# Patient Record
Sex: MALE | Race: WHITE | HISPANIC OR LATINO
[De-identification: names, ages, dates, MRNs, and addresses within clinical notes are randomized per-mention and may not be internally consistent; named-entity substitution may affect disease eponyms.]

---

## 2022-09-05 ENCOUNTER — TRANSCRIPTION ENCOUNTER (OUTPATIENT)
Age: 27
End: 2022-09-05

## 2022-09-06 ENCOUNTER — RESULT REVIEW (OUTPATIENT)
Age: 27
End: 2022-09-06

## 2022-09-06 ENCOUNTER — INPATIENT (INPATIENT)
Facility: HOSPITAL | Age: 27
LOS: 0 days | Discharge: HOME | DRG: 342 | End: 2022-09-07
Attending: STUDENT IN AN ORGANIZED HEALTH CARE EDUCATION/TRAINING PROGRAM | Admitting: INTERNAL MEDICINE
Payer: MEDICAID

## 2022-09-06 VITALS
RESPIRATION RATE: 18 BRPM | OXYGEN SATURATION: 98 % | DIASTOLIC BLOOD PRESSURE: 88 MMHG | HEIGHT: 66 IN | TEMPERATURE: 98 F | HEART RATE: 86 BPM | SYSTOLIC BLOOD PRESSURE: 136 MMHG

## 2022-09-06 DIAGNOSIS — K35.80 UNSPECIFIED ACUTE APPENDICITIS: ICD-10-CM

## 2022-09-06 LAB
ALBUMIN SERPL ELPH-MCNC: 4.1 G/DL — SIGNIFICANT CHANGE UP (ref 3.3–5)
ALP SERPL-CCNC: 107 U/L — SIGNIFICANT CHANGE UP (ref 40–120)
ALT FLD-CCNC: 51 U/L — HIGH (ref 10–45)
ANION GAP SERPL CALC-SCNC: 6 MMOL/L — SIGNIFICANT CHANGE UP (ref 5–17)
APPEARANCE UR: CLEAR — SIGNIFICANT CHANGE UP
APTT BLD: 29.4 SEC — SIGNIFICANT CHANGE UP (ref 27.5–35.5)
AST SERPL-CCNC: 21 U/L — SIGNIFICANT CHANGE UP (ref 10–40)
BASOPHILS # BLD AUTO: 0.05 K/UL — SIGNIFICANT CHANGE UP (ref 0–0.2)
BASOPHILS NFR BLD AUTO: 0.4 % — SIGNIFICANT CHANGE UP (ref 0–2)
BILIRUB SERPL-MCNC: 0.2 MG/DL — SIGNIFICANT CHANGE UP (ref 0.2–1.2)
BILIRUB UR-MCNC: NEGATIVE — SIGNIFICANT CHANGE UP
BLD GP AB SCN SERPL QL: SIGNIFICANT CHANGE UP
BUN SERPL-MCNC: 16 MG/DL — SIGNIFICANT CHANGE UP (ref 7–23)
CALCIUM SERPL-MCNC: 8.9 MG/DL — SIGNIFICANT CHANGE UP (ref 8.4–10.5)
CHLORIDE SERPL-SCNC: 102 MMOL/L — SIGNIFICANT CHANGE UP (ref 96–108)
CO2 SERPL-SCNC: 29 MMOL/L — SIGNIFICANT CHANGE UP (ref 22–31)
COLOR SPEC: YELLOW — SIGNIFICANT CHANGE UP
CREAT SERPL-MCNC: 1.11 MG/DL — SIGNIFICANT CHANGE UP (ref 0.5–1.3)
DIFF PNL FLD: ABNORMAL
EGFR: 93 ML/MIN/1.73M2 — SIGNIFICANT CHANGE UP
EOSINOPHIL # BLD AUTO: 0.32 K/UL — SIGNIFICANT CHANGE UP (ref 0–0.5)
EOSINOPHIL NFR BLD AUTO: 2.5 % — SIGNIFICANT CHANGE UP (ref 0–6)
EPI CELLS # UR: SIGNIFICANT CHANGE UP
GLUCOSE SERPL-MCNC: 95 MG/DL — SIGNIFICANT CHANGE UP (ref 70–99)
GLUCOSE UR QL: NEGATIVE — SIGNIFICANT CHANGE UP
HCT VFR BLD CALC: 45.1 % — SIGNIFICANT CHANGE UP (ref 39–50)
HGB BLD-MCNC: 15.3 G/DL — SIGNIFICANT CHANGE UP (ref 13–17)
IMM GRANULOCYTES NFR BLD AUTO: 0.7 % — SIGNIFICANT CHANGE UP (ref 0–1.5)
INR BLD: 1.05 RATIO — SIGNIFICANT CHANGE UP (ref 0.88–1.16)
KETONES UR-MCNC: NEGATIVE — SIGNIFICANT CHANGE UP
LEUKOCYTE ESTERASE UR-ACNC: NEGATIVE — SIGNIFICANT CHANGE UP
LIDOCAIN IGE QN: 63 U/L — LOW (ref 73–393)
LYMPHOCYTES # BLD AUTO: 17.9 % — SIGNIFICANT CHANGE UP (ref 13–44)
LYMPHOCYTES # BLD AUTO: 2.29 K/UL — SIGNIFICANT CHANGE UP (ref 1–3.3)
MCHC RBC-ENTMCNC: 29.3 PG — SIGNIFICANT CHANGE UP (ref 27–34)
MCHC RBC-ENTMCNC: 33.9 GM/DL — SIGNIFICANT CHANGE UP (ref 32–36)
MCV RBC AUTO: 86.4 FL — SIGNIFICANT CHANGE UP (ref 80–100)
MONOCYTES # BLD AUTO: 1.03 K/UL — HIGH (ref 0–0.9)
MONOCYTES NFR BLD AUTO: 8.1 % — SIGNIFICANT CHANGE UP (ref 2–14)
NEUTROPHILS # BLD AUTO: 9.01 K/UL — HIGH (ref 1.8–7.4)
NEUTROPHILS NFR BLD AUTO: 70.4 % — SIGNIFICANT CHANGE UP (ref 43–77)
NITRITE UR-MCNC: NEGATIVE — SIGNIFICANT CHANGE UP
NRBC # BLD: 0 /100 WBCS — SIGNIFICANT CHANGE UP (ref 0–0)
PH UR: 6 — SIGNIFICANT CHANGE UP (ref 5–8)
PLATELET # BLD AUTO: 249 K/UL — SIGNIFICANT CHANGE UP (ref 150–400)
POTASSIUM SERPL-MCNC: 4.1 MMOL/L — SIGNIFICANT CHANGE UP (ref 3.5–5.3)
POTASSIUM SERPL-SCNC: 4.1 MMOL/L — SIGNIFICANT CHANGE UP (ref 3.5–5.3)
PROT SERPL-MCNC: 7.9 G/DL — SIGNIFICANT CHANGE UP (ref 6–8.3)
PROT UR-MCNC: NEGATIVE — SIGNIFICANT CHANGE UP
PROTHROM AB SERPL-ACNC: 12.2 SEC — SIGNIFICANT CHANGE UP (ref 10.5–13.4)
RBC # BLD: 5.22 M/UL — SIGNIFICANT CHANGE UP (ref 4.2–5.8)
RBC # FLD: 12.3 % — SIGNIFICANT CHANGE UP (ref 10.3–14.5)
RBC CASTS # UR COMP ASSIST: NEGATIVE /HPF — SIGNIFICANT CHANGE UP (ref 0–4)
SARS-COV-2 RNA SPEC QL NAA+PROBE: SIGNIFICANT CHANGE UP
SODIUM SERPL-SCNC: 137 MMOL/L — SIGNIFICANT CHANGE UP (ref 135–145)
SP GR SPEC: 1.01 — SIGNIFICANT CHANGE UP (ref 1.01–1.02)
UROBILINOGEN FLD QL: NEGATIVE — SIGNIFICANT CHANGE UP
WBC # BLD: 12.79 K/UL — HIGH (ref 3.8–10.5)
WBC # FLD AUTO: 12.79 K/UL — HIGH (ref 3.8–10.5)
WBC UR QL: NEGATIVE /HPF — SIGNIFICANT CHANGE UP (ref 0–5)

## 2022-09-06 PROCEDURE — 44970 LAPAROSCOPY APPENDECTOMY: CPT

## 2022-09-06 PROCEDURE — 93010 ELECTROCARDIOGRAM REPORT: CPT

## 2022-09-06 PROCEDURE — 71045 X-RAY EXAM CHEST 1 VIEW: CPT | Mod: 26

## 2022-09-06 PROCEDURE — 99223 1ST HOSP IP/OBS HIGH 75: CPT

## 2022-09-06 PROCEDURE — 88304 TISSUE EXAM BY PATHOLOGIST: CPT | Mod: 26

## 2022-09-06 PROCEDURE — 74177 CT ABD & PELVIS W/CONTRAST: CPT | Mod: 26,MA

## 2022-09-06 PROCEDURE — 99285 EMERGENCY DEPT VISIT HI MDM: CPT

## 2022-09-06 PROCEDURE — 99253 IP/OBS CNSLTJ NEW/EST LOW 45: CPT | Mod: 57

## 2022-09-06 DEVICE — STAPLER COVIDIEN TRI-STAPLE 30MM TAN RELOAD: Type: IMPLANTABLE DEVICE | Status: FUNCTIONAL

## 2022-09-06 DEVICE — STAPLER COVIDIEN TRI-STAPLE 30MM GRAY RELOAD: Type: IMPLANTABLE DEVICE | Status: FUNCTIONAL

## 2022-09-06 DEVICE — STAPLER COVIDIEN TRI-STAPLE 30MM PURPLE RELOAD: Type: IMPLANTABLE DEVICE | Status: FUNCTIONAL

## 2022-09-06 DEVICE — STAPLER COVIDIEN TRI-STAPLE 60MM PURPLE RELOAD: Type: IMPLANTABLE DEVICE | Status: FUNCTIONAL

## 2022-09-06 DEVICE — CLIP APPLIER COVIDIEN ENDOCLIP 5MM: Type: IMPLANTABLE DEVICE | Status: FUNCTIONAL

## 2022-09-06 DEVICE — CLIP APPLIER COVIDIEN ENDOCLIP II 10MM MED/LG: Type: IMPLANTABLE DEVICE | Status: FUNCTIONAL

## 2022-09-06 RX ORDER — HYDROMORPHONE HYDROCHLORIDE 2 MG/ML
0.5 INJECTION INTRAMUSCULAR; INTRAVENOUS; SUBCUTANEOUS
Refills: 0 | Status: DISCONTINUED | OUTPATIENT
Start: 2022-09-06 | End: 2022-09-07

## 2022-09-06 RX ORDER — ONDANSETRON 8 MG/1
4 TABLET, FILM COATED ORAL ONCE
Refills: 0 | Status: DISCONTINUED | OUTPATIENT
Start: 2022-09-06 | End: 2022-09-07

## 2022-09-06 RX ORDER — LANOLIN ALCOHOL/MO/W.PET/CERES
3 CREAM (GRAM) TOPICAL AT BEDTIME
Refills: 0 | Status: DISCONTINUED | OUTPATIENT
Start: 2022-09-06 | End: 2022-09-07

## 2022-09-06 RX ORDER — CIPROFLOXACIN LACTATE 400MG/40ML
400 VIAL (ML) INTRAVENOUS ONCE
Refills: 0 | Status: COMPLETED | OUTPATIENT
Start: 2022-09-06 | End: 2022-09-06

## 2022-09-06 RX ORDER — ONDANSETRON 8 MG/1
4 TABLET, FILM COATED ORAL EVERY 8 HOURS
Refills: 0 | Status: DISCONTINUED | OUTPATIENT
Start: 2022-09-06 | End: 2022-09-07

## 2022-09-06 RX ORDER — MORPHINE SULFATE 50 MG/1
2 CAPSULE, EXTENDED RELEASE ORAL EVERY 4 HOURS
Refills: 0 | Status: DISCONTINUED | OUTPATIENT
Start: 2022-09-06 | End: 2022-09-07

## 2022-09-06 RX ORDER — METRONIDAZOLE 500 MG
500 TABLET ORAL ONCE
Refills: 0 | Status: COMPLETED | OUTPATIENT
Start: 2022-09-06 | End: 2022-09-06

## 2022-09-06 RX ORDER — MORPHINE SULFATE 50 MG/1
4 CAPSULE, EXTENDED RELEASE ORAL ONCE
Refills: 0 | Status: DISCONTINUED | OUTPATIENT
Start: 2022-09-06 | End: 2022-09-06

## 2022-09-06 RX ORDER — HYDROMORPHONE HYDROCHLORIDE 2 MG/ML
1 INJECTION INTRAMUSCULAR; INTRAVENOUS; SUBCUTANEOUS
Refills: 0 | Status: DISCONTINUED | OUTPATIENT
Start: 2022-09-06 | End: 2022-09-07

## 2022-09-06 RX ORDER — CIPROFLOXACIN LACTATE 400MG/40ML
400 VIAL (ML) INTRAVENOUS EVERY 12 HOURS
Refills: 0 | Status: DISCONTINUED | OUTPATIENT
Start: 2022-09-07 | End: 2022-09-07

## 2022-09-06 RX ORDER — SODIUM CHLORIDE 9 MG/ML
1000 INJECTION, SOLUTION INTRAVENOUS
Refills: 0 | Status: DISCONTINUED | OUTPATIENT
Start: 2022-09-06 | End: 2022-09-07

## 2022-09-06 RX ORDER — METRONIDAZOLE 500 MG
500 TABLET ORAL EVERY 8 HOURS
Refills: 0 | Status: DISCONTINUED | OUTPATIENT
Start: 2022-09-06 | End: 2022-09-07

## 2022-09-06 RX ORDER — SODIUM CHLORIDE 9 MG/ML
1000 INJECTION INTRAMUSCULAR; INTRAVENOUS; SUBCUTANEOUS ONCE
Refills: 0 | Status: COMPLETED | OUTPATIENT
Start: 2022-09-06 | End: 2022-09-06

## 2022-09-06 RX ORDER — SODIUM CHLORIDE 9 MG/ML
1000 INJECTION INTRAMUSCULAR; INTRAVENOUS; SUBCUTANEOUS
Refills: 0 | Status: DISCONTINUED | OUTPATIENT
Start: 2022-09-06 | End: 2022-09-07

## 2022-09-06 RX ORDER — ACETAMINOPHEN 500 MG
650 TABLET ORAL EVERY 6 HOURS
Refills: 0 | Status: DISCONTINUED | OUTPATIENT
Start: 2022-09-06 | End: 2022-09-07

## 2022-09-06 RX ADMIN — MORPHINE SULFATE 4 MILLIGRAM(S): 50 CAPSULE, EXTENDED RELEASE ORAL at 12:32

## 2022-09-06 RX ADMIN — Medication 500 MILLIGRAM(S): at 15:06

## 2022-09-06 RX ADMIN — Medication 200 MILLIGRAM(S): at 15:06

## 2022-09-06 RX ADMIN — SODIUM CHLORIDE 1000 MILLILITER(S): 9 INJECTION INTRAMUSCULAR; INTRAVENOUS; SUBCUTANEOUS at 12:26

## 2022-09-06 RX ADMIN — Medication 100 MILLIGRAM(S): at 14:06

## 2022-09-06 RX ADMIN — MORPHINE SULFATE 4 MILLIGRAM(S): 50 CAPSULE, EXTENDED RELEASE ORAL at 13:02

## 2022-09-06 RX ADMIN — SODIUM CHLORIDE 1000 MILLILITER(S): 9 INJECTION INTRAMUSCULAR; INTRAVENOUS; SUBCUTANEOUS at 13:26

## 2022-09-06 NOTE — H&P ADULT - NSHPPHYSICALEXAM_GEN_ALL_CORE
Vital Signs Last 24 Hrs  T(F): 98.3 (06 Sep 2022 11:49), Max: 98.3 (06 Sep 2022 11:49)  HR: 86 (06 Sep 2022 11:49) (86 - 86)  BP: 136/88 (06 Sep 2022 11:49) (136/88 - 136/88)  RR: 18 (06 Sep 2022 11:49) (18 - 18)  SpO2: 98% (06 Sep 2022 11:49) (98% - 98%)    PHYSICAL EXAM:  GENERAL: NAD, well-groomed, well-developed  HEAD:  Atraumatic, Normocephalic  EYES: EOMI, conjunctiva and sclera clear  ENMT: Moist mucous membranes, Good dentition, no thrush  NECK: Supple, No JVD  CHEST/LUNG: Clear to auscultation bilaterally, good air entry, non-labored breathing  HEART: RRR; S1/S2, No murmur  ABDOMEN: Soft, Nontender, Nondistended; Bowel sounds present  VASCULAR: Normal pulses, Normal capillary refill  EXTREMITIES: No calf tenderness, No cyanosis, No edema  LYMPH: Normal; No lymphadenopathy noted  SKIN: Warm, Intact  PSYCH: Normal mood, Normal affect  NERVOUS SYSTEM:  A/O x3, Good concentration; CN 2-12 intact, No focal deficits Vital Signs Last 24 Hrs  T(F): 98.3 (06 Sep 2022 11:49), Max: 98.3 (06 Sep 2022 11:49)  HR: 86 (06 Sep 2022 11:49) (86 - 86)  BP: 136/88 (06 Sep 2022 11:49) (136/88 - 136/88)  RR: 18 (06 Sep 2022 11:49) (18 - 18)  SpO2: 98% (06 Sep 2022 11:49) (98% - 98%)    PHYSICAL EXAM:  GENERAL: NAD, well-groomed, well-developed  HEAD:  Atraumatic, Normocephalic  EYES: EOMI, conjunctiva and sclera clear  ENMT: Moist mucous membranes, Good dentition, no thrush  NECK: Supple, No JVD  CHEST/LUNG: Clear to auscultation bilaterally, good air entry, non-labored breathing  HEART: RRR; S1/S2, No murmur  ABDOMEN: Soft, +tenderess midpigastrium and RLQ, Nondistended; Bowel sounds present  VASCULAR: Normal pulses, Normal capillary refill  EXTREMITIES: No calf tenderness, No cyanosis, No edema  LYMPH: Normal; No lymphadenopathy noted  SKIN: Warm, Intact  PSYCH: Normal mood, Normal affect  NERVOUS SYSTEM:  A/O x3, Good concentration; CN 2-12 intact, No focal deficits

## 2022-09-06 NOTE — ED PROVIDER NOTE - OBJECTIVE STATEMENT
27 yr old male with no pmhx presents with generalized abdominal pain x 3 days. Pt reports bloody stool on toilet paper x 1 episode yesterday. Pt reports normal BM today. Denies fever, chills, n/v/d, headache, urinary complaints.

## 2022-09-06 NOTE — ED PROVIDER NOTE - NS ED ATTENDING STATEMENT MOD
This was a shared visit with the KAMALJIT. I reviewed and verified the documentation and independently performed the documented:

## 2022-09-06 NOTE — H&P ADULT - NSHPLABSRESULTS_GEN_ALL_CORE
15.3   12.79 )-----------( 249      ( 06 Sep 2022 12:25 )             45.1           137  |  102  |  16  ----------------------------<  95  4.1   |  29  |  1.11    Ca    8.9      06 Sep 2022 12:25    TPro  7.9  /  Alb  4.1  /  TBili  0.2  /  DBili  x   /  AST  21  /  ALT  51  /  AlkPhos  107      Urinalysis Basic - ( 06 Sep 2022 13:20 )    Color: Yellow / Appearance: Clear / S.015 / pH: x  Gluc: x / Ketone: Negative  / Bili: Negative / Urobili: Negative   Blood: x / Protein: Negative / Nitrite: Negative   Leuk Esterase: Negative / RBC: Negative /HPF / WBC Negative /HPF   Sq Epi: x / Non Sq Epi: Neg.-Few / Bacteria: x    Lipase, Serum: 63 U/L (22 @ 12:25)      RADIOLOGY:  CT Abdomen and Pelvis w/ IV Cont (22 @ 13:14) >  IMPRESSION:  Acute appendicitis.    Consultant(s) Notes Reveiwed [ x] Yes   Dr Hall, NPO for OR later today  Care Discussed with [ ] Consultants  [ x] Patient  [ ] Family  [ ] /   [ x] Other; RN

## 2022-09-06 NOTE — H&P ADULT - HISTORY OF PRESENT ILLNESS
28 yo male, no PMH, presenting to ER with abdominal pain.      CTAP shows acute appendicitis, Dr Hall consulted in ER  Given Ciprofloxacin and flagyl  To go to OR tonight 26 yo male, no PMH, presenting to ER with abdominal pain.  Patient reports pain started on Friday, midepigastric area, sharp in nature, intermittent in timing.  The pain worsened this morning and he decided to come to the ER.  He denies fever, cough, SOB, N/V, diarrhea, travel hx or sick contacts.    CTAP shows acute appendicitis, Dr Hall consulted in ER  Given Ciprofloxacin and flagyl  To go to OR tonight

## 2022-09-06 NOTE — H&P ADULT - ASSESSMENT
28 yo male, no PMH, presenting to ER with abdominal pain found to have acute appendicitis    *Acute appendicitis with leukocytosis    NPO for OR later today, IVF  Patient is in medical optimal condition for OR  Monitor F/WBC count, abx given in ER, further abx as per surgical team    DVT PPX: holding lovenox as going to OR today  AM labs, Full code  DISP: pending course

## 2022-09-06 NOTE — H&P ADULT - NSHPREVIEWOFSYSTEMS_GEN_ALL_CORE
REVIEW OF SYSTEMS:  CONSTITUTIONAL: No fever, weight loss, or fatigue  EYES: No eye pain, visual disturbances, or discharge  ENMT:  No difficulty hearing, tinnitus, vertigo; No sinus or throat pain  NECK: No pain or stiffness  BREASTS: No pain, masses, or nipple discharge  RESPIRATORY: No cough, wheezing, chills or hemoptysis; No shortness of breath  CARDIOVASCULAR: No chest pain, palpitations, dizziness, or leg swelling  GASTROINTESTINAL: No abdominal or epigastric pain. No nausea, vomiting, or hematemesis; No diarrhea or constipation. No melena or hematochezia.  GENITOURINARY: No dysuria, frequency, hematuria, or incontinence  NEUROLOGICAL: No headaches, memory loss, loss of strength, numbness, or tremors  SKIN: No itching, burning, rashes, or lesions   LYMPH NODES: No enlarged glands  ENDOCRINE: No heat or cold intolerance; No hair loss  MUSCULOSKELETAL: No joint pain or swelling; No muscle, back, or extremity pain  PSYCHIATRIC: No depression, anxiety, mood swings, or difficulty sleeping  HEME/LYMPH: No easy bruising, or bleeding gums  ALLERY AND IMMUNOLOGIC: No hives or eczema    ALL ROS REVIEWED AND NORMAL EXCEPT AS STATED ABOVE REVIEW OF SYSTEMS:  CONSTITUTIONAL: No fever, weight loss, or fatigue  EYES: No eye pain, visual disturbances, or discharge  ENMT:  No difficulty hearing, tinnitus, vertigo; No sinus or throat pain  NECK: No pain or stiffness  BREASTS: No pain, masses, or nipple discharge  RESPIRATORY: No cough, wheezing, chills or hemoptysis; No shortness of breath  CARDIOVASCULAR: No chest pain, palpitations, dizziness, or leg swelling  GASTROINTESTINAL: ++ abdominal/epigastric pain. No nausea, vomiting, or hematemesis; No diarrhea or constipation. No melena or hematochezia.  GENITOURINARY: No dysuria, frequency, hematuria, or incontinence  NEUROLOGICAL: No headaches, memory loss, loss of strength, numbness, or tremors  SKIN: No itching, burning, rashes, or lesions   LYMPH NODES: No enlarged glands  ENDOCRINE: No heat or cold intolerance; No hair loss  MUSCULOSKELETAL: No joint pain or swelling; No muscle, back, or extremity pain  PSYCHIATRIC: No depression, anxiety, mood swings, or difficulty sleeping  HEME/LYMPH: No easy bruising, or bleeding gums  ALLERY AND IMMUNOLOGIC: No hives or eczema    ALL ROS REVIEWED AND NORMAL EXCEPT AS STATED ABOVE

## 2022-09-06 NOTE — ED ADULT NURSE NOTE - OBJECTIVE STATEMENT
Pt came from home with c/o RLQ pain x 3 days. Pt states that the pain has worsened today, rating it 7/10. Pt denies any fevers, or n/v/d. Pt denies taking pain meds PTA. Pt with no PMH.

## 2022-09-06 NOTE — ED PROVIDER NOTE - ATTENDING APP SHARED VISIT CONTRIBUTION OF CARE
Radha with [facility]. 27 yr old male with no pmhx presents with generalized abdominal pain x 3 days. Pt reports bloody stool on toilet paper x 1 episode yesterday. Pt reports normal BM today. Denies fever, chills, n/v/d, headache, urinary complaints.   RLQ tenderness on exam   labs, CT ordered   labs and ct reviewed- + acute appendicitis   Dr. Hall consulted, pt will go to OR tonight. pt to be kept NPO. d/w Dr. Oconnell. pt agrees with plan    I have discussed the patient's plan of care with Physician Assistant (PA). I agree with note as stated above.  This includes History of Present Illness, HIV, Past Medical/Surgical/Family/Social History, Allergies and Home Medications, Review of Systems, Physical Exam, and any Progress Notes during the time I functioned as the attending physician for this patient.

## 2022-09-06 NOTE — H&P ADULT - NSHPSOCIALHISTORY_GEN_ALL_CORE
REVIEW OF SYSTEMS:  CONSTITUTIONAL: No fever, weight loss, or fatigue  EYES: No eye pain, visual disturbances, or discharge  ENMT:  No difficulty hearing, tinnitus, vertigo; No sinus or throat pain  NECK: No pain or stiffness  BREASTS: No pain, masses, or nipple discharge  RESPIRATORY: No cough, wheezing, chills or hemoptysis; No shortness of breath  CARDIOVASCULAR: No chest pain, palpitations, dizziness, or leg swelling  GASTROINTESTINAL: No abdominal or epigastric pain. No nausea, vomiting, or hematemesis; No diarrhea or constipation. No melena or hematochezia.  GENITOURINARY: No dysuria, frequency, hematuria, or incontinence  NEUROLOGICAL: No headaches, memory loss, loss of strength, numbness, or tremors  SKIN: No itching, burning, rashes, or lesions   LYMPH NODES: No enlarged glands  ENDOCRINE: No heat or cold intolerance; No hair loss  MUSCULOSKELETAL: No joint pain or swelling; No muscle, back, or extremity pain  PSYCHIATRIC: No depression, anxiety, mood swings, or difficulty sleeping  HEME/LYMPH: No easy bruising, or bleeding gums  ALLERY AND IMMUNOLOGIC: No hives or eczema    ALL ROS REVIEWED AND NORMAL EXCEPT AS STATED ABOVE Social History:    Marital Status: (  ) , ( x ) Single, (  ) , (  ) , (  )   Lives with: ( x ) alone, (  ) children, (  ) spouse, (  ) parents, (  ) siblings, (  ) friends, (  ) other:     Substance Use/Illicit Drugs: (  x) never used vs other:   Tobacco Usage: (x  ) never smoked, (  ) former smoker, (  ) current smoker and Total Pack-Years:   Last Alcohol Usage/Frequency/Amount/Withdrawal/Hx of Abuse:  Denies  Foreign travel: Denies  Animal exposure: Denies

## 2022-09-06 NOTE — CONSULT NOTE ADULT - ASSESSMENT
Impression/Plan:  YOSSI JORDAN is a 28yo man with acute appendicitis.     #ACUTE APPENDICITIS  - Added on for OR tonight.   - Continue cirpo/flagyl.   - NPO, IVF  --------------------------------------------------------    HPI:  YOSSI JORDAN is a 28yo man who presented with .     Medical Hx:    Surgical Hx:    Pertinent Home Medications:    Pertinent Inpatient Medications:  acetaminophen     Tablet .. 650 milliGRAM(s) Oral every 6 hours PRN  aluminum hydroxide/magnesium hydroxide/simethicone Suspension 30 milliLiter(s) Oral every 4 hours PRN  melatonin 3 milliGRAM(s) Oral at bedtime PRN  metroNIDAZOLE  IVPB 500 milliGRAM(s) IV Intermittent every 8 hours  morphine  - Injectable 2 milliGRAM(s) IV Push every 4 hours PRN  ondansetron Injectable 4 milliGRAM(s) IV Push every 8 hours PRN  sodium chloride 0.9%. 1000 milliLiter(s) IV Continuous <Continuous>    Allergies:  No Known Allergies    Social Hx:  No tobacco use, excessive ETOH use, or illicit drug use.     Family Hx:  No pertinent family history in first degree relatives      ROS:  Except as noted in the HPI or above, all other systems negative.     Objective:   T(C): 36.8, Max: 36.8 (09-06-22 @ 11:49)  HR: 86 (86 - 86)  BP: 136/88 (136/88 - 136/88)  RR: 18 (18 - 18)  SpO2: 98% (98% - 98%)    Physical Exam  Constitutional: No distress.  Head/Neck: EOMI; PERRL; no discharge or nodules.  Breasts: No discharge, erythema, or masses.  Back: No deformities or CVA tenderness.  Respiratory: Clear to auscultation, no accessory muscle use or distress.  Cardiovascular: RRR.  Gastrointestinal: Soft, non-distended, non-tender. No signs of diffuse peritonitis. No ventral or inguinal hernias.  Genitourinary: Not examined.  Anorectal: Not examined.  Extremities: No cyanosis, clubbing or edema.  Vascular: Warm, well-perfused.  Neurological: Alert and oriented. No focal deficits.   Skin: No pertinent lesions or rashes.   Lymph Nodes: No significant lymphadedenopathy.  Musculoskeletal: No joint pain, swelling or deformity; no limitation of movement  Psychiatric: Normal affect, verbalizations, behavior.     Laboratory Data    Other Pertinent Non-Imaging Data    Imaging Data Impression/Plan:  YOSSI JORDAN is a 28yo man with acute appendicitis.     #ACUTE APPENDICITIS  - Added on for OR tonight.   - Continue cirpo/flagyl.   - NPO, IVF    --------------------------------------------------------    HPI:  YOSSI JORDAN is a 28yo healthy man who presented with abdominal pain. Pain started around 9/2 as a constant, cramping, epigastric pain that fluctuated in intensity. No relation to food or significant modifying factors. Associated with diminished appetite and nausea without vomiting. No fevers, chills, dysuria, constipation, diarrhea, or other major symptoms at this time. Did not prevent him from continuing his work as a  but came in today because of persistent pain.     Medical Hx:  None    Surgical Hx:  None    Pertinent Home Medications:  None    Allergies:  **AMOXICILLIN**    Social Hx:  No tobacco use, excessive ETOH use, or illicit drug use.     Family Hx:  No pertinent family history in first degree relatives    ROS:  Except as noted in the HPI or above, all other systems negative.     Objective:   T(C): 36.8, Max: 36.8 (09-06-22 @ 11:49)  HR: 86 (86 - 86)  BP: 136/88 (136/88 - 136/88)  RR: 18 (18 - 18)  SpO2: 98% (98% - 98%)    Physical Exam  Constitutional: No distress.  Head/Neck: EOMI; PERRL; no discharge or nodules.  Back: No deformities or CVA tenderness.  Respiratory: Clear to auscultation, no accessory muscle use or distress.  Cardiovascular: RRR.  Gastrointestinal: Non-distended. TTP in the RLQ without signs of diffuse peritonitis.   Extremities: No cyanosis, clubbing or edema.  Vascular: Warm, well-perfused.  Neurological: Alert and oriented. No focal deficits.   Skin: No pertinent lesions or rashes.   Lymph Nodes: No significant lymphadedenopathy.  Musculoskeletal: No joint pain, swelling or deformity; no limitation of movement  Psychiatric: Normal affect, verbalizations, behavior.     Laboratory Data            x                   x     | x    | x      x     )------( x         --------------------<x                  x                   x     | x    | x        TP    x    | ALB x         CA x             PT 12.2     ----------------------  AST x    | ALT x         MAG x          PTT 29.4             ----------------------  TBIL x    | DBIL x         PHOS x         INR 1.05    ----------------------  ALP x       Imaging Data  CT ABD/PEL 9/6:  Dilated appendix with surrounding fat stranding.

## 2022-09-06 NOTE — PRE-OP CHECKLIST - VERIFY SURGICAL SITE/SIDE WITH PATIENT
Fasting previous blood tests have been ordered.  
Patient is scheduled for a complete physical on 5/14/21.  Patient was advised to come in for fasting lab work 3-7 days prior to the physical.  Lab visit has been scheduled.  Please place lab order.             
done

## 2022-09-06 NOTE — ED PROVIDER NOTE - CLINICAL SUMMARY MEDICAL DECISION MAKING FREE TEXT BOX
27 yr old male with no pmhx presents with generalized abdominal pain x 3 days. Pt reports bloody stool on toilet paper x 1 episode yesterday. Pt reports normal BM today. Denies fever, chills, n/v/d, headache, urinary complaints.   RLQ tenderness on exam   labs, CT ordered 27 yr old male with no pmhx presents with generalized abdominal pain x 3 days. Pt reports bloody stool on toilet paper x 1 episode yesterday. Pt reports normal BM today. Denies fever, chills, n/v/d, headache, urinary complaints.   RLQ tenderness on exam   labs, CT ordered   labs and ct reviewed- + acute appendicitis   Dr. Hall consulted, pt will go to OR tonight. pt to be kept NPO. d/w Dr. Oconnell. pt agrees with plan

## 2022-09-07 ENCOUNTER — TRANSCRIPTION ENCOUNTER (OUTPATIENT)
Age: 27
End: 2022-09-07

## 2022-09-07 VITALS
DIASTOLIC BLOOD PRESSURE: 73 MMHG | SYSTOLIC BLOOD PRESSURE: 107 MMHG | OXYGEN SATURATION: 94 % | TEMPERATURE: 97 F | RESPIRATION RATE: 18 BRPM | HEART RATE: 97 BPM

## 2022-09-07 LAB
ANION GAP SERPL CALC-SCNC: 10 MMOL/L — SIGNIFICANT CHANGE UP (ref 5–17)
ANION GAP SERPL CALC-SCNC: 6 MMOL/L — SIGNIFICANT CHANGE UP (ref 5–17)
BASOPHILS # BLD AUTO: 0.01 K/UL — SIGNIFICANT CHANGE UP (ref 0–0.2)
BASOPHILS NFR BLD AUTO: 0.1 % — SIGNIFICANT CHANGE UP (ref 0–2)
BUN SERPL-MCNC: 15 MG/DL — SIGNIFICANT CHANGE UP (ref 7–23)
BUN SERPL-MCNC: 16 MG/DL — SIGNIFICANT CHANGE UP (ref 7–23)
CALCIUM SERPL-MCNC: 8.7 MG/DL — SIGNIFICANT CHANGE UP (ref 8.4–10.5)
CALCIUM SERPL-MCNC: 9 MG/DL — SIGNIFICANT CHANGE UP (ref 8.4–10.5)
CHLORIDE SERPL-SCNC: 102 MMOL/L — SIGNIFICANT CHANGE UP (ref 96–108)
CHLORIDE SERPL-SCNC: 103 MMOL/L — SIGNIFICANT CHANGE UP (ref 96–108)
CO2 SERPL-SCNC: 24 MMOL/L — SIGNIFICANT CHANGE UP (ref 22–31)
CO2 SERPL-SCNC: 28 MMOL/L — SIGNIFICANT CHANGE UP (ref 22–31)
CREAT SERPL-MCNC: 0.98 MG/DL — SIGNIFICANT CHANGE UP (ref 0.5–1.3)
CREAT SERPL-MCNC: 1.46 MG/DL — HIGH (ref 0.5–1.3)
CULTURE RESULTS: NO GROWTH — SIGNIFICANT CHANGE UP
EGFR: 108 ML/MIN/1.73M2 — SIGNIFICANT CHANGE UP
EGFR: 67 ML/MIN/1.73M2 — SIGNIFICANT CHANGE UP
EOSINOPHIL # BLD AUTO: 0 K/UL — SIGNIFICANT CHANGE UP (ref 0–0.5)
EOSINOPHIL NFR BLD AUTO: 0 % — SIGNIFICANT CHANGE UP (ref 0–6)
GLUCOSE SERPL-MCNC: 122 MG/DL — HIGH (ref 70–99)
GLUCOSE SERPL-MCNC: 177 MG/DL — HIGH (ref 70–99)
HCT VFR BLD CALC: 43.1 % — SIGNIFICANT CHANGE UP (ref 39–50)
HGB BLD-MCNC: 14.6 G/DL — SIGNIFICANT CHANGE UP (ref 13–17)
IMM GRANULOCYTES NFR BLD AUTO: 0.7 % — SIGNIFICANT CHANGE UP (ref 0–1.5)
LYMPHOCYTES # BLD AUTO: 0.91 K/UL — LOW (ref 1–3.3)
LYMPHOCYTES # BLD AUTO: 8.4 % — LOW (ref 13–44)
MAGNESIUM SERPL-MCNC: 1.8 MG/DL — SIGNIFICANT CHANGE UP (ref 1.6–2.6)
MCHC RBC-ENTMCNC: 29.1 PG — SIGNIFICANT CHANGE UP (ref 27–34)
MCHC RBC-ENTMCNC: 33.9 GM/DL — SIGNIFICANT CHANGE UP (ref 32–36)
MCV RBC AUTO: 85.9 FL — SIGNIFICANT CHANGE UP (ref 80–100)
MONOCYTES # BLD AUTO: 0.17 K/UL — SIGNIFICANT CHANGE UP (ref 0–0.9)
MONOCYTES NFR BLD AUTO: 1.6 % — LOW (ref 2–14)
NEUTROPHILS # BLD AUTO: 9.72 K/UL — HIGH (ref 1.8–7.4)
NEUTROPHILS NFR BLD AUTO: 89.2 % — HIGH (ref 43–77)
NRBC # BLD: 0 /100 WBCS — SIGNIFICANT CHANGE UP (ref 0–0)
PLATELET # BLD AUTO: 234 K/UL — SIGNIFICANT CHANGE UP (ref 150–400)
POTASSIUM SERPL-MCNC: 3.5 MMOL/L — SIGNIFICANT CHANGE UP (ref 3.5–5.3)
POTASSIUM SERPL-MCNC: 3.8 MMOL/L — SIGNIFICANT CHANGE UP (ref 3.5–5.3)
POTASSIUM SERPL-SCNC: 3.5 MMOL/L — SIGNIFICANT CHANGE UP (ref 3.5–5.3)
POTASSIUM SERPL-SCNC: 3.8 MMOL/L — SIGNIFICANT CHANGE UP (ref 3.5–5.3)
RBC # BLD: 5.02 M/UL — SIGNIFICANT CHANGE UP (ref 4.2–5.8)
RBC # FLD: 12.3 % — SIGNIFICANT CHANGE UP (ref 10.3–14.5)
SODIUM SERPL-SCNC: 136 MMOL/L — SIGNIFICANT CHANGE UP (ref 135–145)
SODIUM SERPL-SCNC: 137 MMOL/L — SIGNIFICANT CHANGE UP (ref 135–145)
SPECIMEN SOURCE: SIGNIFICANT CHANGE UP
WBC # BLD: 10.89 K/UL — HIGH (ref 3.8–10.5)
WBC # FLD AUTO: 10.89 K/UL — HIGH (ref 3.8–10.5)

## 2022-09-07 PROCEDURE — 85730 THROMBOPLASTIN TIME PARTIAL: CPT

## 2022-09-07 PROCEDURE — 87635 SARS-COV-2 COVID-19 AMP PRB: CPT

## 2022-09-07 PROCEDURE — 96374 THER/PROPH/DIAG INJ IV PUSH: CPT

## 2022-09-07 PROCEDURE — 85025 COMPLETE CBC W/AUTO DIFF WBC: CPT

## 2022-09-07 PROCEDURE — 99285 EMERGENCY DEPT VISIT HI MDM: CPT

## 2022-09-07 PROCEDURE — 86850 RBC ANTIBODY SCREEN: CPT

## 2022-09-07 PROCEDURE — 85610 PROTHROMBIN TIME: CPT

## 2022-09-07 PROCEDURE — 99024 POSTOP FOLLOW-UP VISIT: CPT

## 2022-09-07 PROCEDURE — 99239 HOSP IP/OBS DSCHRG MGMT >30: CPT

## 2022-09-07 PROCEDURE — 80053 COMPREHEN METABOLIC PANEL: CPT

## 2022-09-07 PROCEDURE — 83735 ASSAY OF MAGNESIUM: CPT

## 2022-09-07 PROCEDURE — 81001 URINALYSIS AUTO W/SCOPE: CPT

## 2022-09-07 PROCEDURE — 87086 URINE CULTURE/COLONY COUNT: CPT

## 2022-09-07 PROCEDURE — 86900 BLOOD TYPING SEROLOGIC ABO: CPT

## 2022-09-07 PROCEDURE — 74177 CT ABD & PELVIS W/CONTRAST: CPT | Mod: MA

## 2022-09-07 PROCEDURE — 83690 ASSAY OF LIPASE: CPT

## 2022-09-07 PROCEDURE — 71045 X-RAY EXAM CHEST 1 VIEW: CPT

## 2022-09-07 PROCEDURE — 86901 BLOOD TYPING SEROLOGIC RH(D): CPT

## 2022-09-07 PROCEDURE — 80048 BASIC METABOLIC PNL TOTAL CA: CPT

## 2022-09-07 PROCEDURE — C9399: CPT

## 2022-09-07 PROCEDURE — 36415 COLL VENOUS BLD VENIPUNCTURE: CPT

## 2022-09-07 PROCEDURE — 96361 HYDRATE IV INFUSION ADD-ON: CPT

## 2022-09-07 PROCEDURE — 93005 ELECTROCARDIOGRAM TRACING: CPT

## 2022-09-07 PROCEDURE — 88304 TISSUE EXAM BY PATHOLOGIST: CPT

## 2022-09-07 RX ORDER — OXYCODONE HYDROCHLORIDE 5 MG/1
5 TABLET ORAL
Refills: 0 | Status: DISCONTINUED | OUTPATIENT
Start: 2022-09-07 | End: 2022-09-07

## 2022-09-07 RX ORDER — OXYCODONE HYDROCHLORIDE 5 MG/1
10 TABLET ORAL
Refills: 0 | Status: DISCONTINUED | OUTPATIENT
Start: 2022-09-07 | End: 2022-09-07

## 2022-09-07 RX ORDER — CIPROFLOXACIN LACTATE 400MG/40ML
400 VIAL (ML) INTRAVENOUS EVERY 12 HOURS
Refills: 0 | Status: DISCONTINUED | OUTPATIENT
Start: 2022-09-07 | End: 2022-09-07

## 2022-09-07 RX ORDER — LANOLIN ALCOHOL/MO/W.PET/CERES
3 CREAM (GRAM) TOPICAL AT BEDTIME
Refills: 0 | Status: DISCONTINUED | OUTPATIENT
Start: 2022-09-07 | End: 2022-09-07

## 2022-09-07 RX ORDER — METRONIDAZOLE 500 MG
500 TABLET ORAL EVERY 8 HOURS
Refills: 0 | Status: DISCONTINUED | OUTPATIENT
Start: 2022-09-07 | End: 2022-09-07

## 2022-09-07 RX ORDER — SODIUM CHLORIDE 9 MG/ML
1000 INJECTION, SOLUTION INTRAVENOUS
Refills: 0 | Status: DISCONTINUED | OUTPATIENT
Start: 2022-09-07 | End: 2022-09-07

## 2022-09-07 RX ORDER — ACETAMINOPHEN 500 MG
2 TABLET ORAL
Qty: 0 | Refills: 0 | DISCHARGE
Start: 2022-09-07

## 2022-09-07 RX ORDER — HYDROMORPHONE HYDROCHLORIDE 2 MG/ML
0.5 INJECTION INTRAMUSCULAR; INTRAVENOUS; SUBCUTANEOUS
Refills: 0 | Status: DISCONTINUED | OUTPATIENT
Start: 2022-09-07 | End: 2022-09-07

## 2022-09-07 RX ORDER — INFLUENZA VIRUS VACCINE 15; 15; 15; 15 UG/.5ML; UG/.5ML; UG/.5ML; UG/.5ML
0.5 SUSPENSION INTRAMUSCULAR ONCE
Refills: 0 | Status: DISCONTINUED | OUTPATIENT
Start: 2022-09-07 | End: 2022-09-07

## 2022-09-07 RX ORDER — ACETAMINOPHEN 500 MG
650 TABLET ORAL EVERY 6 HOURS
Refills: 0 | Status: DISCONTINUED | OUTPATIENT
Start: 2022-09-07 | End: 2022-09-07

## 2022-09-07 RX ADMIN — Medication 200 MILLIGRAM(S): at 04:05

## 2022-09-07 RX ADMIN — Medication 100 MILLIGRAM(S): at 05:24

## 2022-09-07 RX ADMIN — Medication 650 MILLIGRAM(S): at 01:01

## 2022-09-07 RX ADMIN — Medication 650 MILLIGRAM(S): at 02:01

## 2022-09-07 NOTE — PROGRESS NOTE ADULT - ASSESSMENT
Impression/Plan:  YOSSI JORDAN is a 28yo man with acute appendicitis POD 1 lap appy.     #ACUTE APPENDICITIS  9/6/22: Laparoscopic appendectomy  - Continue diet and light activity.   - May shower.   - No heavy lifting for 2-3 weeks.   - Repeat BMP at noon to f/u creatinine.   - Possible discharge in PM.     --------------------------------------------------------    Subjective:  Recovering well. Tolerating diet. Urinating without issue. No flatus or BM yet.     Objective:  T(C): 36.6, Max: 37.1 (09-06-22 @ 19:09)  HR: 94 (71 - 94)  BP: 117/72 (117/72 - 136/88)  RR: 18 (14 - 18)  SpO2: 95% (95% - 100%)    Physical Exam  Comfortable eating breakfast.   Incision CDI with dermabond. No hematoma.     Laboratory Data            14.6                136   | 102  | 15     10.89 )------( 234       --------------------<177                43.1                3.5   | 28   | 1.46     TP    x    | ALB x         CA 9.0           PT x        ----------------------  AST x    | ALT x         MAG 1.8        PTT x                ----------------------  TBIL x    | DBIL x         PHOS x         INR x       ----------------------  ALP x        Imaging Data  CT ABD/PEL 9/6:  Dilated appendix with surrounding fat stranding.

## 2022-09-07 NOTE — DISCHARGE NOTE PROVIDER - CARE PROVIDER_API CALL
Santos Hall)  Surgery  [address], [address]  [place], [place] [id number]  Phone: [phone]  Fax: [phone]  Follow Up Time:

## 2022-09-07 NOTE — PROGRESS NOTE ADULT - NS ATTEND AMEND GEN_ALL_CORE FT
28 yo male, no PMH, presenting to ER with abdominal pain found to have acute appendicitis    #acute appendicitis  - s/p lap appey last night, tolerated well  - continue diet and ambulate, patient stable and no acute issues  - no abx on discharge as per surgery  - discussed with Dr. Hall- dc later today     #CAROLYN  - likely in setting of dehydration  - continue IVF  - follow up BMP at noon  - follow up outpatient, likely to dc after regardless as patient making urine and stable

## 2022-09-07 NOTE — CHART NOTE - NSCHARTNOTEFT_GEN_A_CORE
Called by pharmacy regarding multiple duplicate orders for IAVB and pain medications, all pending duplicate orders d/c, surgical team to follow up and address orders as needed.

## 2022-09-07 NOTE — DISCHARGE NOTE PROVIDER - NSDCACTIVITY_GEN_ALL_CORE
No restrictions Showering allowed/Walking - Indoors allowed/No heavy lifting/straining/Walking - Outdoors allowed/Follow Instructions Provided by your Surgical Team

## 2022-09-07 NOTE — DISCHARGE NOTE NURSING/CASE MANAGEMENT/SOCIAL WORK - PATIENT PORTAL LINK FT
You can access the FollowMyHealth Patient Portal offered by [facility] by registering at the following website: [email or web address]. By joining "[employer]’s FollowMyHealth portal, you will also be able to view your health information using other applications (apps) compatible with our system.

## 2022-09-07 NOTE — PATIENT PROFILE ADULT - FALL HARM RISK - RISK INTERVENTIONS
Assistance OOB with selected safe patient handling equipment/Assistance with ambulation/Communicate Fall Risk and Risk Factors to all staff, patient, and family/Monitor gait and stability/Reinforce activity limits and safety measures with patient and family/Sit up slowly, dangle for a short time, stand at bedside before walking/Use of alarms - bed, chair and/or voice tab/Visual Cue: Yellow wristband/Bed in lowest position, wheels locked, appropriate side rails in place/Call bell, personal items and telephone in reach/Instruct patient to call for assistance before getting out of bed or chair/Non-slip footwear when patient is out of bed/[place] to call system/Physically safe environment - no spills, clutter or unnecessary equipment/Purposeful Proactive Rounding/Room/bathroom lighting operational, light cord in reach

## 2022-09-07 NOTE — DISCHARGE NOTE PROVIDER - HOSPITAL COURSE
Hospital Course  HPI:  26 yo male, no PMH, presenting to ER with abdominal pain.  Patient reports pain started on Friday, midepigastric area, sharp in nature, intermittent in timing.  The pain worsened this morning.  Pt diagnosed with Acute appendicitis .  CT of abd/pelvis - confirmed acute appendicitis .  Pt under went Laparscopic appendectomy.  Post op did well without complications and was Dced home to follow up with Dr Hall in one week.      CTAP shows acute appendicitis, Dr Hall consulted in ER  Given Ciprofloxacin and flagyl  To go to OR [facility] (06 Sep 2022 14:07)      You were admitted for abdominal pain   You were diagnosed with acute inflammation of the appendix (Acute Appendicitis)  You were treated with antibiotics and surgical removal of the  appendix through laparscopic incision   You were prescribed the following new medications: no new medications     You will need to follow up with your with Dr Hall your surgeon in 1 week .    Source of Infection: acute appendicitis  Antibiotic / Last Day: Completed in hospital     Palliative Care / Advanced Care Planning  Code Status: full   Patient/Family agreeable to Hospice/Palliative (N)  Summary of Goals of Care Conversation:    Discharging Provider:  Qing Rivas   Contact Info: Cell [phone] - Please call with any questions or concerns.    Outpatient Provider:  Dr Hall      Hospital Course  HPI:  26 yo male, no PMH, presenting to ER with abdominal pain.  Patient reports pain started on Friday, midepigastric area, sharp in nature, intermittent in timing.  The pain worsened this morning.  Pt diagnosed with Acute appendicitis .  CT of abd/pelvis - confirmed acute appendicitis .  Pt under went Laparscopic appendectomy.  Post op did well without complications and was Dced home to follow up with Dr Hall in one week.      CTAP shows acute appendicitis, Dr Hall consulted in ER  Given Ciprofloxacin and flagyl  To go to OR tonight (06 Sep 2022 14:07)    Patient admitted for appendicitis, s/p lap appey with surgery. Patient noted to have CAROLYN, s/p IVF. Stable for discharge with outpatient follow up.    You were admitted for abdominal pain   You were diagnosed with acute inflammation of the appendix (Acute Appendicitis)  You were treated with antibiotics and surgical removal of the  appendix through laparscopic incision   You were prescribed the following new medications: no new medications     You will need to follow up with your with Dr Hall your surgeon in 1 week .    Source of Infection: acute appendicitis  Antibiotic / Last Day: Completed in hospital     Discharging Provider:  Qing Rivas   Contact Info: Cell [phone] - Please call with any questions or concerns.    Outpatient Provider:  Dr Hall

## 2022-09-07 NOTE — PROGRESS NOTE ADULT - SUBJECTIVE AND OBJECTIVE BOX
Patient is a 27y old  Male who presents with a chief complaint of Acute appendicitis (06 Sep 2022 16:23)      Patient seen and examined at bedside.    ALLERGIES:  No Known Allergies    MEDICATIONS  (STANDING):  ciprofloxacin   IVPB 400 milliGRAM(s) IV Intermittent every 12 hours  influenza   Vaccine 0.5 milliLiter(s) IntraMuscular once  metroNIDAZOLE  IVPB 500 milliGRAM(s) IV Intermittent every 8 hours  sodium chloride 0.9%. 1000 milliLiter(s) (100 mL/Hr) IV Continuous <Continuous>    MEDICATIONS  (PRN):  acetaminophen     Tablet .. 650 milliGRAM(s) Oral every 6 hours PRN Temp greater or equal to 38C (100.4F), Mild Pain (1 - 3)  aluminum hydroxide/magnesium hydroxide/simethicone Suspension 30 milliLiter(s) Oral every 4 hours PRN Dyspepsia  melatonin 3 milliGRAM(s) Oral at bedtime PRN Insomnia  morphine  - Injectable 2 milliGRAM(s) IV Push every 4 hours PRN Severe Pain (7 - 10)  ondansetron Injectable 4 milliGRAM(s) IV Push every 8 hours PRN Nausea and/or Vomiting    Vital Signs Last 24 Hrs  T(F): 97.8 (07 Sep 2022 05:24), Max: 98.7 (06 Sep 2022 19:09)  HR: 94 (07 Sep 2022 05:24) (71 - 94)  BP: 117/72 (07 Sep 2022 05:24) (117/72 - 136/88)  RR: 18 (07 Sep 2022 05:24) (14 - 18)  SpO2: 95% (07 Sep 2022 05:24) (95% - 100%)  I&O's Summary    06 Sep 2022 07:01  -  07 Sep 2022 07:00  --------------------------------------------------------  IN: 0 mL / OUT: 500 mL / NET: -500 mL      PHYSICAL EXAM:  General: NAD, A/O x 3  ENT: MMM  Neck: Supple, No JVD  Lungs: Clear to auscultation bilaterally, Non labored breathing   Cardio: RRR, S1/S2, No murmurs  Abdomen: Soft, Nontender, Nondistended; Bowel sounds present  Extremities: No calf tenderness, No pitting edema    LABS:                        14.6   10.89 )-----------( 234      ( 07 Sep 2022 05:51 )             43.1         136  |  102  |  15  ----------------------------<  177  3.5   |  28  |  1.46    Ca    9.0      07 Sep 2022 05:51  Mg     1.8         TPro  7.9  /  Alb  4.1  /  TBili  0.2  /  DBili  x   /  AST  21  /  ALT  51  /  AlkPhos  107        PT/INR - ( 06 Sep 2022 14:16 )   PT: 12.2 sec;   INR: 1.05 ratio         PTT - ( 06 Sep 2022 14:16 )  PTT:29.4 sec                          Urinalysis Basic - ( 06 Sep 2022 13:20 )    Color: Yellow / Appearance: Clear / S.015 / pH: x  Gluc: x / Ketone: Negative  / Bili: Negative / Urobili: Negative   Blood: x / Protein: Negative / Nitrite: Negative   Leuk Esterase: Negative / RBC: Negative /HPF / WBC Negative /HPF   Sq Epi: x / Non Sq Epi: Neg.-Few / Bacteria: x        COVID-19 PCR: [place] (22 @ 14:16)    RADIOLOGY & ADDITIONAL TESTS:    Care Discussed with Consultants/Other Providers:    Patient is a 27y old  Male who presents with a chief complaint of Acute appendicitis (06 Sep 2022 16:23)      Patient seen and examined at bedside.  Pt had uneventful night.  C/o mild abd pain.  Tolerated Liquids last pm.    ALLERGIES:  No Known Allergies    MEDICATIONS  (STANDING):  ciprofloxacin   IVPB 400 milliGRAM(s) IV Intermittent every 12 hours  influenza   Vaccine 0.5 milliLiter(s) IntraMuscular once  metroNIDAZOLE  IVPB 500 milliGRAM(s) IV Intermittent every 8 hours  sodium chloride 0.9%. 1000 milliLiter(s) (100 mL/Hr) IV Continuous <Continuous>    MEDICATIONS  (PRN):  acetaminophen     Tablet .. 650 milliGRAM(s) Oral every 6 hours PRN Temp greater or equal to 38C (100.4F), Mild Pain (1 - 3)  aluminum hydroxide/magnesium hydroxide/simethicone Suspension 30 milliLiter(s) Oral every 4 hours PRN Dyspepsia  melatonin 3 milliGRAM(s) Oral at bedtime PRN Insomnia  morphine  - Injectable 2 milliGRAM(s) IV Push every 4 hours PRN Severe Pain (7 - 10)  ondansetron Injectable 4 milliGRAM(s) IV Push every 8 hours PRN Nausea and/or Vomiting    Vital Signs Last 24 Hrs  T(F): 97.8 (07 Sep 2022 05:24), Max: 98.7 (06 Sep 2022 19:09)  HR: 94 (07 Sep 2022 05:24) (71 - 94)  BP: 117/72 (07 Sep 2022 05:24) (117/72 - 136/88)  RR: 18 (07 Sep 2022 05:24) (14 - 18)  SpO2: 95% (07 Sep 2022 05:24) (95% - 100%)  I&O's Summary    06 Sep 2022 07:01  -  07 Sep 2022 07:00  --------------------------------------------------------  IN: 0 mL / OUT: 500 mL / NET: -500 mL      PHYSICAL EXAM:  General: 28 y/o male in NAD, A/O x 3  ENT: MMM  Neck: Supple, No JVD  Lungs: Clear to auscultation bilaterally, Non labored breathing   Cardio: RRR, S1/S2, No murmurs  Abdomen: Soft,  mild tender, Nondistended; Bowel sounds present  Extremities: No calf tenderness, No pitting edema    LABS:                        14.6   10.89 )-----------( 234      ( 07 Sep 2022 05:51 )             43.1         136  |  102  |  15  ----------------------------<  177  3.5   |  28  |  1.46    Ca    9.0      07 Sep 2022 05:51  Mg     1.8         TPro  7.9  /  Alb  4.1  /  TBili  0.2  /  DBili  x   /  AST  21  /  ALT  51  /  AlkPhos  107        PT/INR - ( 06 Sep 2022 14:16 )   PT: 12.2 sec;   INR: 1.05 ratio         PTT - ( 06 Sep 2022 14:16 )  PTT:29.4 sec                          Urinalysis Basic - ( 06 Sep 2022 13:20 )    Color: Yellow / Appearance: Clear / S.015 / pH: x  Gluc: x / Ketone: Negative  / Bili: Negative / Urobili: Negative   Blood: x / Protein: Negative / Nitrite: Negative   Leuk Esterase: Negative / RBC: Negative /HPF / WBC Negative /HPF   Sq Epi: x / Non Sq Epi: Neg.-Few / Bacteria: x        COVID-19 PCR: [place] (22 @ 14:16)    RADIOLOGY & ADDITIONAL TESTS:    Care Discussed with Consultants/Other Providers:

## 2022-09-07 NOTE — DISCHARGE NOTE NURSING/CASE MANAGEMENT/SOCIAL WORK - NSDCPEFALRISK_GEN_ALL_CORE
For information on Fall & Injury Prevention, visit: [email or web address] OR  [email or web address] OR  https://www.cdc.gov/steadi/patient.html

## 2022-09-07 NOTE — DISCHARGE NOTE PROVIDER - NSDCCPCAREPLAN_GEN_ALL_CORE_FT
PRINCIPAL DISCHARGE DIAGNOSIS  Diagnosis: Acute appendicitis  Assessment and Plan of Treatment: You were admitted for abdominal pain   You were diagnosed with acute inflammation of the appendix (Acute Appendicitis)  You were treated with antibiotics and surgical removal of the  appendix through laparscopic incision   You were prescribed the following new medications: no new medications   You will need to follow up with your with Dr Grullon your surgeon in 1 week .       PRINCIPAL DISCHARGE DIAGNOSIS  Diagnosis: Acute appendicitis  Assessment and Plan of Treatment: You were admitted for abdominal pain   You were diagnosed with acute inflammation of the appendix (Acute Appendicitis)  You were treated with antibiotics and surgical removal of the  appendix through laparscopic incision   You will need to follow up with your with Dr Hall within 1 week of discharge. No heavy lifting for 2-3 weeks       PRINCIPAL DISCHARGE DIAGNOSIS  Diagnosis: Acute appendicitis  Assessment and Plan of Treatment: You were admitted for abdominal pain   You were diagnosed with acute inflammation of the appendix (Acute Appendicitis)  You were treated with antibiotics and surgical removal of the  appendix through laparscopic incision   You will need to follow up with your with Dr Hall within 1 week of discharge. No heavy lifting for 2-3 weeks      SECONDARY DISCHARGE DIAGNOSES  Diagnosis: CAROLYN (acute kidney injury)  Assessment and Plan of Treatment: You were noted to have mild worsening of kidney function likely from surgery vs not eating. You were given IV fluids. Continue to take oral fluids at home. Follow up outpatient

## 2022-09-07 NOTE — PROGRESS NOTE ADULT - ASSESSMENT
26 yo male, no PMH, presenting to ER with abdominal pain found to have acute appendicitis    *Acute appendicitis with leukocytosis  PoD#1 lap appendectomy   surgery Dr Hall- following  Continue abx as per surgery   advance diet as per surgery     Dispo: Discharge home  26 yo male, no PMH, presenting to ER with abdominal pain found to have acute appendicitis    *Acute appendicitis with leukocytosis  PoD#1 lap appendectomy   surgery Dr Hall- following  Continue abx as per surgery   regular diet  anticipate discharge home today     CAROLYN   continue IVF  Repeat Bmp at 12 noon today     Dispo: Discharge home

## 2022-09-08 PROBLEM — Z78.9 OTHER SPECIFIED HEALTH STATUS: Chronic | Status: ACTIVE | Noted: 2022-09-06

## 2022-09-13 ENCOUNTER — APPOINTMENT (OUTPATIENT)
Dept: SURGERY | Facility: CLINIC | Age: 27
End: 2022-09-13

## 2022-09-13 VITALS
DIASTOLIC BLOOD PRESSURE: 80 MMHG | TEMPERATURE: 98 F | HEART RATE: 85 BPM | WEIGHT: 190 LBS | HEIGHT: 66 IN | SYSTOLIC BLOOD PRESSURE: 128 MMHG | BODY MASS INDEX: 30.53 KG/M2 | OXYGEN SATURATION: 97 %

## 2022-09-13 DIAGNOSIS — Z09 ENCOUNTER FOR FOLLOW-UP EXAMINATION AFTER COMPLETED TREATMENT FOR CONDITIONS OTHER THAN MALIGNANT NEOPLASM: ICD-10-CM

## 2022-09-13 PROBLEM — Z00.00 ENCOUNTER FOR PREVENTIVE HEALTH EXAMINATION: Status: ACTIVE | Noted: 2022-09-13

## 2022-09-13 LAB — SURGICAL PATHOLOGY STUDY: SIGNIFICANT CHANGE UP

## 2022-09-13 PROCEDURE — 99024 POSTOP FOLLOW-UP VISIT: CPT

## 2022-09-13 NOTE — PHYSICAL EXAM
[Respiratory Effort] : normal respiratory effort [de-identified] : No distress.  [de-identified] : Ecchymosis around incisions healing well. Non-distended. Incisions intact with Dermabond.

## 2022-09-13 NOTE — HISTORY OF PRESENT ILLNESS
[de-identified] : YOSSI JORDAN is a 27 year-old man who was treated at [facility] from 9/6 - 9/7/22 for acute appendicitis. He underwent a laparoscopic appendectomy on 9/6/22 uncomplicated appendicitis. Pathology results are pending. He presents today for follow-up. \par  [de-identified] : Post-op, patient required one day of opioids for pain, but is currently not taking any medication. Occasional twinges of pain inferomedial to his left lateral incision. He is tolerating his usual diet without nausea or vomiting. Urinating and stooling normally. No fevers, chills, or other major concerns at this time.

## 2022-09-19 ENCOUNTER — NON-APPOINTMENT (OUTPATIENT)
Age: 27
End: 2022-09-19

## 2023-02-24 NOTE — PATIENT PROFILE ADULT - HAVE YOU HAD A FIRST COVID-19 BOOSTER?
----- Message from Pawan Adhikari MA sent at 2/24/2023  9:22 AM CST -----  Regarding: sinus infection advice  Contact: Mom [phone]  Has a sinus infection and has some questions for the nurses    
Mom states congestion [id number], just started cough yesterday. No fever. Req antibx. Informed Mom we would need to examine pt. Can sched appt today @ [facility] or monitor and Sat AM appts available. Childrens Zyrtec daily, Childrens Dimetapp q6hrs prn, increase fluids. Mom v/u.  
No

## 2023-09-14 NOTE — CONSULT NOTE ADULT - CONSULT REQUESTED BY NAME
Pre-Operative Instructions    Patient name: Laura Seymour         We have scheduled you today for a GI procedure that will be performed sometime within the next few months. Because we realize that there may be some changes in the maintenance of your health after today, but prior to your procedure, we ask that you note the following:    If you have any additional medications that are added to the current medications you are taking, please notify our office so that we can properly instruct you in how to take this around the time of your procedure. For example,  if you started on any type of blood thinner, any type of anti inflammatory medication, or any type of iron supplement or vitamin, these medications will need to be stopped, depending on what they are, several days BEFORE the procedure. If you are given pain medications or any new type of heart medication or blood pressure pills or are a newly diagnoses diabetic put on blood sugar medication, we may also need to make adjustments regarding these.  If you have any type of device implanted (pacemaker, defibrillator, implanted pain device or stimulator, or have any kind of joint replacement) please let us know, as special arrangements may need to be made in order for the procedure to be performed.  If you develop new allergies, such as Latex or Versed, special arrangements may need to be made.  We will also need to be made aware of any changes in your insurance as we want to be sure that you receive the full benefit of your plan.      We understand that situations may arise causing you to cancel and/or reschedule your procedure date. We would appreciate at least two weeks notice. Thank you for your cooperation.    The staff of the [facility] offers these suggestions if you are scheduled for surgery.    Plan for unforeseen changes in surgery time. Although we try to maintain a set surgery schedule, there are times when a surgery case  may take longer than expected. This may result in your being in the surgery center longer than originally planned.      Arrange for an adult to stay with you at the surgery center. It is very important that you have an adult stay with you at the surgery center during your procedure. The medications you receive can make you sleepy and forgetful. For this reason, the doctor may want to discuss your surgery and post-operative care with an adult friend or family member. Additionally, an adult will be needed to drive you home. An adult must stay with you for the first 24 hours after your surgery. IF YOU DO  NOT HAVE AN ADULT TO DRIVE YOU HOME, YOUR SURGERY WILL BE CANCELLED.     Remember to follow pre-operative dietary restrictions. An empty stomach is imperative to prevent nausea or vomiting during and after your procedure.      Things to bring with you:  a list of your current medications (including \"over the counter\" and herbal medications)  important legal documents such as Power of , Guardianship papers  any assistive device you are currently using (crutches, walker, hearing aid, etc.)    Limit visitors while you are at the surgery center. The time spent at the surgery center is very brief and will be limited to preparing you for surgery and assisting you to recover afterwards. Surgery center rooms are very small, which requires us to limit the number of visitors allowed in at one time.      Avoid alcoholic beverages. Because you will be asked to \"fast\" before your procedure, your body will be in a naturally dehydrated state. Alcohol will add to this dehydration making you more prone to problems with blood pressure during and after your procedure You should avoid alcoholic beverages for at least 24 hours prior to your surgery.    Wear loose comfortable clothes. Clothes that are easy to put on and take off are best. Sweat pants, shirts with buttons, and slip-on shoes are wise choices. Avoid tight-fitting  clothing such as blue jeans and turtlenecks.    Patients on Anticoagulation Medications:   IF YOU HAVE NOT RECEIVED INSTRUCTIONS REGARDING YOUR BLOOD THINNING MEDICATIONS WITHIN 7 DAYS OF YOUR SURGERY, PLEASE CALL THE GI DOCTOR'S OFFICE. FAILURE TO DO SO MAY RESULT IN CANCELLATION OF YOUR SURGERY.     Ask questions and insist on answers. Surgery can be a stressful time for anyone. If you have any questions or are unsure about any information given to you, be sure to ask for clarification. A patient can never ask too many questions. If there is something on your mind, let us know. We always want you to feel safe and confident in the care you are receiving.     TO ALL [facility] AMBULATORY SURGERY PATIENTS    You can decide today about the care you will receive in the future. [facility] respects your rights and will support your decisions to the fullest extent permitted by law, including your right to refuse or accept medical or surgical treatment. Please be advised that the [facility] prohibits ambulatory surgery centers from upholding Advanced Directives during surgical procedures. For this reason, any Advanced Directive will be null and void during your stay in the [facility].     Although not honored in the [facility], you are still entitled to be informed about your rights surrounding Advanced Directives. Advanced Directives are legal documents that enable you to specify what forms of treatment you want performed or withheld should you become unable to make or communicate these decisions on your own.  Although this is not a common decision made by outpatient surgery patients, we would like to let you know that an information booklet, \"A Personal Decision\" is available upon your request.      How do you know if an Advanced Directive is right for you?  It is important to realize your  Shila Oconnell rights as an individual, what the nature of consent for treatment implies, what a durable power of  for health care is and what a living will is.      After reviewing the booklet, \"A Personal Decision,\" should you have any further questions, please call us at [phone].      Thank you.     PATIENT’S RIGHTS    To be treated with respect, consideration and dignity, free from all forms of abuse, harassment and discrimination.     To receive quality care and high professional standards in a safe environment.    To be provided with appropriate privacy.    To expect that all disclosures and records are treated confidentially and, except when required by law, to be given the opportunity to approve or refuse their release.    To be provided, to the degree known, complete information concerning their diagnosis, treatment and prognosis. When appropriate, the information is provided to a person designated by the patient to be a legally authorized person.    To review the records pertaining to his/her medical care and to have the information explained or interpreted as necessary except when restricted law.    To expect that we will communicate with you in a matter that you can understand.     To be given the names of all practitioners and health care personnel participating in his/her care.    To make decisions about the plan of care prior to and during the course of treatment.  To refuse a recommended treatment or plan of care to the extent permitted by law and to be informed of the medical consequences of this action.     To expect that your treatment preferences will be responded to as delineated in your Advanced Directive, within the limits of the ASC bylaws regarding resuscitation    To be informed as to:  Rights and Responsibilities.  Services available in the organization.  Provisions for after-hours and emergency care.   The charges for services and available payment options.   The right to consent or decline  participation in proposed research  studies.   The available resources for resolving disputes, grievances, and conflicts.      To expect emergency procedures to be implemented without unnecessary delay.    To expect a safe hospital transfer with appropriate medical records when necessary.     To know that all patients rights extend to the patient’s legal representatives.     Complaints regarding Patients Rights or any issue surrounding care received during your stay can be made by contacting any of the following organizations:  Medicare patient: Visit the Office of Medicare Beneficiary [place] at www.medicare.gov on the web.  Or call 1-800-Medicare (1-542.649.7470).     TTY users should call 1-625.408.8481.  Non-Medicare patients can contact the [facility] at [phone]; fax [phone], TTY [phone].  Any patient can also contact the [employer] at [phone] (toll free 8:30 am to 5:00 pm, central time, weekdays).        Patient Responsibilities    It is your responsibility as a [facility] patient:    To provide all personal and family health information needed to provide you with appropriate care.    To participate to the best of your ability in making decisions about your medical treatment, and to comply with the agreed upon plan of care.    To ask questions of your physician or other care providers when you do not understand any information or instructions.    To maintain appointments as scheduled, or to reschedule in a timely fashion.    To inform your physician and other care providers if you anticipate problems in following prescribed treatment.    To recognize the impact of your lifestyle on your personal health.    To inform your physician or other care provider if you desire a transfer of care to another physician.    To be considerate of others receiving and providing care.  To observe relevant surgery center policies and procedures.    To  accept financial responsibility for health care services and to work cooperatively with [facility] to resolve financial obligations    Please contact [facility] regarding billing, to verify your coverage and any out-of-pocket responsibility at [phone]

## 2024-08-23 NOTE — BRIEF OPERATIVE NOTE - NSICDXBRIEFPROCEDURE_GEN_ALL_CORE_FT
Patient drowsy awakens easy no complaints to Recovery Area PROCEDURES:  Laparoscopic appendectomy 06-Sep-2022 23:40:27  Santos Hall
